# Patient Record
Sex: MALE | Race: BLACK OR AFRICAN AMERICAN | NOT HISPANIC OR LATINO | URBAN - METROPOLITAN AREA
[De-identification: names, ages, dates, MRNs, and addresses within clinical notes are randomized per-mention and may not be internally consistent; named-entity substitution may affect disease eponyms.]

---

## 2020-03-17 ENCOUNTER — OUTPATIENT (OUTPATIENT)
Dept: OUTPATIENT SERVICES | Facility: HOSPITAL | Age: 55
LOS: 1 days | End: 2020-03-17
Payer: COMMERCIAL

## 2020-03-17 VITALS
OXYGEN SATURATION: 100 % | RESPIRATION RATE: 16 BRPM | SYSTOLIC BLOOD PRESSURE: 136 MMHG | WEIGHT: 294.1 LBS | HEIGHT: 71 IN | DIASTOLIC BLOOD PRESSURE: 70 MMHG | HEART RATE: 95 BPM | TEMPERATURE: 98 F

## 2020-03-17 DIAGNOSIS — M77.9 ENTHESOPATHY, UNSPECIFIED: Chronic | ICD-10-CM

## 2020-03-17 DIAGNOSIS — N47.1 PHIMOSIS: ICD-10-CM

## 2020-03-17 DIAGNOSIS — Z90.49 ACQUIRED ABSENCE OF OTHER SPECIFIED PARTS OF DIGESTIVE TRACT: Chronic | ICD-10-CM

## 2020-03-17 LAB
ANION GAP SERPL CALC-SCNC: 15 MMO/L — HIGH (ref 7–14)
BUN SERPL-MCNC: 15 MG/DL — SIGNIFICANT CHANGE UP (ref 7–23)
CALCIUM SERPL-MCNC: 9.7 MG/DL — SIGNIFICANT CHANGE UP (ref 8.4–10.5)
CHLORIDE SERPL-SCNC: 103 MMOL/L — SIGNIFICANT CHANGE UP (ref 98–107)
CO2 SERPL-SCNC: 23 MMOL/L — SIGNIFICANT CHANGE UP (ref 22–31)
CREAT SERPL-MCNC: 0.87 MG/DL — SIGNIFICANT CHANGE UP (ref 0.5–1.3)
GLUCOSE SERPL-MCNC: 99 MG/DL — SIGNIFICANT CHANGE UP (ref 70–99)
HCT VFR BLD CALC: 44.2 % — SIGNIFICANT CHANGE UP (ref 39–50)
HGB BLD-MCNC: 14.5 G/DL — SIGNIFICANT CHANGE UP (ref 13–17)
MCHC RBC-ENTMCNC: 31.9 PG — SIGNIFICANT CHANGE UP (ref 27–34)
MCHC RBC-ENTMCNC: 32.8 % — SIGNIFICANT CHANGE UP (ref 32–36)
MCV RBC AUTO: 97.1 FL — SIGNIFICANT CHANGE UP (ref 80–100)
NRBC # FLD: 0 K/UL — SIGNIFICANT CHANGE UP (ref 0–0)
PLATELET # BLD AUTO: 322 K/UL — SIGNIFICANT CHANGE UP (ref 150–400)
PMV BLD: 10.2 FL — SIGNIFICANT CHANGE UP (ref 7–13)
POTASSIUM SERPL-MCNC: 3.7 MMOL/L — SIGNIFICANT CHANGE UP (ref 3.5–5.3)
POTASSIUM SERPL-SCNC: 3.7 MMOL/L — SIGNIFICANT CHANGE UP (ref 3.5–5.3)
RBC # BLD: 4.55 M/UL — SIGNIFICANT CHANGE UP (ref 4.2–5.8)
RBC # FLD: 12.2 % — SIGNIFICANT CHANGE UP (ref 10.3–14.5)
SODIUM SERPL-SCNC: 141 MMOL/L — SIGNIFICANT CHANGE UP (ref 135–145)
WBC # BLD: 7.48 K/UL — SIGNIFICANT CHANGE UP (ref 3.8–10.5)
WBC # FLD AUTO: 7.48 K/UL — SIGNIFICANT CHANGE UP (ref 3.8–10.5)

## 2020-03-17 PROCEDURE — 93010 ELECTROCARDIOGRAM REPORT: CPT

## 2020-03-17 NOTE — H&P PST ADULT - HISTORY OF PRESENT ILLNESS
Pt is a 55 yr old male scheduled for Circumcision with Dr Mims tentatively 3/25/20. Pt c/o of irritation and fungal inflammation that is now being treated. Pt now desires surgery to correct.

## 2020-03-17 NOTE — H&P PST ADULT - LOCATION OF BACK PAIN
Occasional lower back pain r/t fall last year - pt doing PT and states symptoms improving - pain 3-4/10/lumbar spine

## 2020-03-17 NOTE — H&P PST ADULT - NSICDXPROBLEM_GEN_ALL_CORE_FT
PROBLEM DIAGNOSES  Problem: Phimosis  Assessment and Plan: Pt scheduled for surgery and preop instructions including instructions for taking Famotidine on the day of surgery, given verbally and with use of  written materials, and patient confirming understanding of such instructions using  teach back method.  Comparison EKG requested if needed   OR booking notified of risk for sleep apnea

## 2020-03-17 NOTE — H&P PST ADULT - LAST STRESS TEST
Patient comes to clinic for follow up anticoagulation visit.   Last INR 1/20/2020 was 3.6. Dose decreased per protocol.   Today's INR is 1.5 and is below goal range.  He missed dose on 1/25/2020.  Current warfarin dosing verified with patient. Patient was informed that today's INR result is below therapeutic range and instructed to increase current dose today then resume normal dosing. Discussed return date for next INR.     Bibi Hanson NP is in the office today supervising the treatment. This note was forwarded to her for review. Discussed blood pressure result BP 90/60 as well, patient denies lightheadedness, dizziness, fatigue or shortness of breath.  His breathing in better, no audible wheezing heard.  Per Magalie he is to stay on the same dose of his current blood pressure medications and call if he develops any symptoms.  Has appointment with Dr Delgado on 2/10/2020.     Printed instructions for patient:    Call your physician immediately if you notice any of the following symptoms of a blood clot:   · Sudden weakness in any limb  · Numbness or tingling anywhere  · Visual changes or loss of sight in either eye  · Sudden onset of slurred speech or inability to speak  · Dizziness or faintness  · New pain, swelling, redness or heat in any extremity  · New SOB or chest pain  Symptoms associated with blood clotting/low INR reviewed and verbalizes understanding.    Patient was instructed to contact the clinic with any unusual bleeding or bruising, any changes in medications, diet, health status, lifestyle, or any other changes, questions or concerns. Patient verbalized understanding of all discussed.        denies

## 2020-04-16 PROBLEM — N47.1 PHIMOSIS: Chronic | Status: ACTIVE | Noted: 2020-03-17

## 2020-04-16 PROBLEM — Z87.891 PERSONAL HISTORY OF NICOTINE DEPENDENCE: Chronic | Status: ACTIVE | Noted: 2020-03-17

## 2020-04-16 PROBLEM — E66.9 OBESITY, UNSPECIFIED: Chronic | Status: ACTIVE | Noted: 2020-03-17

## 2020-08-14 NOTE — H&P PST ADULT - TEMPERATURE IN CELSIUS (DEGREES C)
Refill approved for 1 month. Genny Benton needs to be seen for an appointment before further refills are given. 36.4